# Patient Record
Sex: FEMALE | Race: WHITE | URBAN - METROPOLITAN AREA
[De-identification: names, ages, dates, MRNs, and addresses within clinical notes are randomized per-mention and may not be internally consistent; named-entity substitution may affect disease eponyms.]

---

## 2022-09-20 ENCOUNTER — OFFICE VISIT (OUTPATIENT)
Dept: URBAN - METROPOLITAN AREA CLINIC 27 | Facility: CLINIC | Age: 42
End: 2022-09-20
Payer: COMMERCIAL

## 2022-09-20 DIAGNOSIS — H35.413 LATTICE DEGENERATION OF RETINA, BILATERAL: Primary | ICD-10-CM

## 2022-09-20 PROCEDURE — 92014 COMPRE OPH EXAM EST PT 1/>: CPT | Performed by: OPHTHALMOLOGY

## 2022-09-20 PROCEDURE — 92134 CPTRZ OPH DX IMG PST SGM RTA: CPT | Performed by: OPHTHALMOLOGY

## 2022-09-20 ASSESSMENT — INTRAOCULAR PRESSURE
OS: 20
OD: 20

## 2022-09-20 NOTE — IMPRESSION/PLAN
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
S/P -- Laser (01/16/13 ) Plan: Exam remains stable OU. The retinal hole OS remains well-treated. No new retinal tear or detachment is identified on DFE OU. 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 Retinal detachment warning signs and symptoms were reviewed, and the patient was advised to call immediately if experienced. The peripheral retinal hole remains well treated OS. Patient understands to call us with any worsening visual symptoms.  

RTC 24 months, DFE OU